# Patient Record
(demographics unavailable — no encounter records)

---

## 2025-03-04 NOTE — HISTORY OF PRESENT ILLNESS
[FreeTextEntry1] :  CHET is a 8 year male who presents for palpitations. Approximately 5-6 weeks ago, Chet experienced an episode of cold-like symptoms lasting about a week. During this illness, he complained of headaches that came and went over a period of three days. Subsequently, he reported that his heart was 'beeping fast' for about four days. The palpitations were described as more of a racing sensation rather than pounding. Chet did not have a fever during this time and maintained good hydration, drinking plenty of water. Since recovering from the illness, Chet has been feeling well without recurrence of sustained palpitations. However, his mother notes that occasionally, seemingly out of nowhere, Chet will say his heart is 'beeping' fast, often associated with feeling scared. These episodes are infrequent and short-lived. Prior to the recent illness, Chet had not experienced any significant heart-related symptoms.

## 2025-03-04 NOTE — REASON FOR VISIT
[Initial Consultation] : an initial consultation for [Palpitations] : palpitations [Patient] : patient [Mother] : mother [Medical Records] : medical records

## 2025-03-04 NOTE — CONSULT LETTER
[Today's Date] : [unfilled] [Name] : Name: [unfilled] [] : : ~~ [Today's Date:] : [unfilled] [Dear  ___:] : Dear Dr. [unfilled]: [Consult] : I had the pleasure of evaluating your patient, [unfilled]. My full evaluation follows. [Consult - Single Provider] : Thank you very much for allowing me to participate in the care of this patient. If you have any questions, please do not hesitate to contact me. [Sincerely,] : Sincerely, [FreeTextEntry4] : LEANN MCKENZIE MD [FreeTextEntry5] : [address] [place]  [FreeTextEntry6] : tel:[phone] [de-identified] : Anita Barker MD, FAAP, FACC  Pediatric Cardiologist  of Pediatrics [facility]

## 2025-03-04 NOTE — CARDIOLOGY SUMMARY
[Today's Date] : [unfilled] [FreeTextEntry1] : Normal sinus rhythm without preexcitation or ectopy. Heart rate (bpm): 75 [FreeTextEntry2] : 1. Normal left ventricular size, morphology and systolic function. 2. Normal right ventricular morphology with qualitatively normal size and systolic function. 3. Trivial pulmonary valve regurgitation. 4. Trivial tricuspid valve regurgitation, peak systolic instantaneous gradient 17.0 mmHg. 5. No pericardial effusion.

## 2025-03-04 NOTE — PHYSICAL EXAM
[General Appearance - Alert] : alert [General Appearance - In No Acute Distress] : in no acute distress [General Appearance - Well Nourished] : well nourished [General Appearance - Well Developed] : well developed [General Appearance - Well-Appearing] : well appearing [Appearance Of Head] : the head was normocephalic [Facies] : there were no dysmorphic facial features [Sclera] : the conjunctiva were normal [Outer Ear] : the ears and nose were normal in appearance [Examination Of The Oral Cavity] : mucous membranes were moist and pink [Normal Chest Appearance] : the chest was normal in appearance [Auscultation Breath Sounds / Voice Sounds] : breath sounds clear to auscultation bilaterally [Apical Impulse] : quiet precordium with normal apical impulse [Heart Rate And Rhythm] : normal heart rate and rhythm [Heart Sounds] : normal S1 and S2 [No Murmur] : no murmurs  [Heart Sounds Gallop] : no gallops [Heart Sounds Pericardial Friction Rub] : no pericardial rub [Edema] : no edema [Arterial Pulses] : normal upper and lower extremity pulses with no pulse delay [Heart Sounds Click] : no clicks [Capillary Refill Test] : normal capillary refill [Bowel Sounds] : normal bowel sounds [Abdomen Soft] : soft [Nondistended] : nondistended [Abdomen Tenderness] : non-tender [Nail Clubbing] : no clubbing  or cyanosis of the fingers [Motor Tone] : normal muscle strength and tone [Cervical Lymph Nodes Enlarged Anterior] : The anterior cervical nodes were normal [Cervical Lymph Nodes Enlarged Posterior] : The posterior cervical nodes were normal [] : no rash [Skin Lesions] : no lesions [Skin Turgor] : normal turgor [Demonstrated Behavior - Infant Nonreactive To Parents] : interactive [Mood] : mood and affect were appropriate for age [Demonstrated Behavior] : normal behavior

## 2025-03-04 NOTE — HISTORY OF PRESENT ILLNESS
initiation of breastfeeding/breast milk feeding [FreeTextEntry1] :  CHET is a 8 year male who presents for palpitations. Approximately 5-6 weeks ago, Chet experienced an episode of cold-like symptoms lasting about a week. During this illness, he complained of headaches that came and went over a period of three days. Subsequently, he reported that his heart was 'beeping fast' for about four days. The palpitations were described as more of a racing sensation rather than pounding. Chet did not have a fever during this time and maintained good hydration, drinking plenty of water. Since recovering from the illness, Chet has been feeling well without recurrence of sustained palpitations. However, his mother notes that occasionally, seemingly out of nowhere, Chet will say his heart is 'beeping' fast, often associated with feeling scared. These episodes are infrequent and short-lived. Prior to the recent illness, Chet had not experienced any significant heart-related symptoms.

## 2025-03-04 NOTE — CONSULT LETTER
[Today's Date] : [unfilled] [Name] : Name: [unfilled] [] : : ~~ [Today's Date:] : [unfilled] [Dear  ___:] : Dear Dr. [unfilled]: [Consult] : I had the pleasure of evaluating your patient, [unfilled]. My full evaluation follows. [Consult - Single Provider] : Thank you very much for allowing me to participate in the care of this patient. If you have any questions, please do not hesitate to contact me. [Sincerely,] : Sincerely, [FreeTextEntry4] : LEANN MCKENZIE MD [FreeTextEntry5] : [address] [place]  [FreeTextEntry6] : tel:[phone] [de-identified] : Anita Barker MD, FAAP, FACC  Pediatric Cardiologist  of Pediatrics [facility]

## 2025-03-04 NOTE — DISCUSSION/SUMMARY
[FreeTextEntry1] :  Assessment Luis's symptoms are most consistent with benign palpitations, likely exacerbated by his recent viral illness. The normal EKG, echocardiogram, and physical examination are reassuring and suggest no underlying structural or electrical cardiac abnormality. He has trivial TR and PI which estimates normal PA pressures and is within normal limits.  - Reassurance provided to patient and family about the benign nature of the symptoms - No immediate further cardiac testing indicated given the resolution of symptoms and normal diagnostic studies - Advised to monitor for recurrence of sustained palpitations when not ill - If palpitations recur and persist when not ill, consider Holter monitor for one week to further evaluate - Follow up as needed if symptoms worsen or persist  [Needs SBE Prophylaxis] : [unfilled] does not need bacterial endocarditis prophylaxis [PE + No Restrictions] : [unfilled] may participate in the entire physical education program without restriction, including all varsity competitive sports.